# Patient Record
Sex: FEMALE | Race: WHITE | NOT HISPANIC OR LATINO
[De-identification: names, ages, dates, MRNs, and addresses within clinical notes are randomized per-mention and may not be internally consistent; named-entity substitution may affect disease eponyms.]

---

## 2021-10-12 ENCOUNTER — NON-APPOINTMENT (OUTPATIENT)
Age: 41
End: 2021-10-12

## 2021-10-15 PROBLEM — Z00.00 ENCOUNTER FOR PREVENTIVE HEALTH EXAMINATION: Status: ACTIVE | Noted: 2021-10-15

## 2021-10-25 ENCOUNTER — APPOINTMENT (OUTPATIENT)
Dept: OBGYN | Facility: CLINIC | Age: 41
End: 2021-10-25
Payer: COMMERCIAL

## 2021-10-25 VITALS
DIASTOLIC BLOOD PRESSURE: 96 MMHG | BODY MASS INDEX: 30.53 KG/M2 | WEIGHT: 190 LBS | HEIGHT: 66 IN | SYSTOLIC BLOOD PRESSURE: 136 MMHG

## 2021-10-25 DIAGNOSIS — Z78.9 OTHER SPECIFIED HEALTH STATUS: ICD-10-CM

## 2021-10-25 DIAGNOSIS — Z80.42 FAMILY HISTORY OF MALIGNANT NEOPLASM OF PROSTATE: ICD-10-CM

## 2021-10-25 DIAGNOSIS — Z11.51 ENCOUNTER FOR SCREENING FOR HUMAN PAPILLOMAVIRUS (HPV): ICD-10-CM

## 2021-10-25 DIAGNOSIS — N97.9 FEMALE INFERTILITY, UNSPECIFIED: ICD-10-CM

## 2021-10-25 DIAGNOSIS — Z82.49 FAMILY HISTORY OF ISCHEMIC HEART DISEASE AND OTHER DISEASES OF THE CIRCULATORY SYSTEM: ICD-10-CM

## 2021-10-25 PROCEDURE — 99386 PREV VISIT NEW AGE 40-64: CPT

## 2021-10-25 NOTE — DISCUSSION/SUMMARY
[FreeTextEntry1] : - Pap/HPV obtained today\par - STI testing offered; declined\par - Mammo/Sono requisition given\par - discussed menstrual calendar, ovulation tracking, and [facility]RE[facility] referral given; also discussed quality of remaining eggs and possible need for donor egg \par - return for sono; pt w/h/o abdominal myomectomy for fibroids and last sono in 2019\par

## 2021-10-25 NOTE — COUNSELING
[Nutrition/ Exercise/ Weight Management] : nutrition, exercise, weight management [Vitamins/Supplements] : vitamins/supplements [Breast Self Exam] : breast self exam [Contraception/ Emergency Contraception/ Safe Sexual Practices] : contraception, emergency contraception, safe sexual practices [Preconception Care/ Fertility options] : preconception care, fertility options [FreeTextEntry2] : Vulvar Hygiene

## 2021-10-25 NOTE — PHYSICAL EXAM
[Appropriately responsive] : appropriately responsive [Alert] : alert [No Acute Distress] : no acute distress [Soft] : soft [Non-tender] : non-tender [Non-distended] : non-distended [No HSM] : No HSM [No Lesions] : no lesions [No Mass] : no mass [Oriented x3] : oriented x3 [Examination Of The Breasts] : a normal appearance [No Masses] : no breast masses were palpable [Labia Majora] : normal [Labia Minora] : normal [Normal] : normal [Uterine Adnexae] : normal [FreeTextEntry7] : midline vertical scar from abdominal myomectomy

## 2021-10-25 NOTE — HISTORY OF PRESENT ILLNESS
[Currently Active] : currently active [Men] : men [Vaginal] : vaginal [No] : No [FreeTextEntry1] : 41yo nullip LMP 10/6/21 here for establishment of care.  No complaints.  \par Did see fertility specialist affiliated w/[facility] but did not have a good experience.  Partner also hasn’t fathered any children [Patient refuses STI testing] : Patient refuses STI testing

## 2021-10-26 LAB — HPV HIGH+LOW RISK DNA PNL CVX: NOT DETECTED

## 2021-11-03 LAB — CYTOLOGY CVX/VAG DOC THIN PREP: ABNORMAL

## 2021-12-15 ENCOUNTER — APPOINTMENT (OUTPATIENT)
Dept: MAMMOGRAPHY | Facility: CLINIC | Age: 41
End: 2021-12-15
Payer: COMMERCIAL

## 2021-12-15 ENCOUNTER — OUTPATIENT (OUTPATIENT)
Dept: OUTPATIENT SERVICES | Facility: HOSPITAL | Age: 41
LOS: 1 days | End: 2021-12-15
Payer: COMMERCIAL

## 2021-12-15 ENCOUNTER — APPOINTMENT (OUTPATIENT)
Dept: ULTRASOUND IMAGING | Facility: CLINIC | Age: 41
End: 2021-12-15
Payer: COMMERCIAL

## 2021-12-15 ENCOUNTER — RESULT REVIEW (OUTPATIENT)
Age: 41
End: 2021-12-15

## 2021-12-15 DIAGNOSIS — Z00.8 ENCOUNTER FOR OTHER GENERAL EXAMINATION: ICD-10-CM

## 2021-12-15 PROCEDURE — 76641 ULTRASOUND BREAST COMPLETE: CPT | Mod: 26,50

## 2021-12-15 PROCEDURE — 77067 SCR MAMMO BI INCL CAD: CPT

## 2021-12-15 PROCEDURE — 77067 SCR MAMMO BI INCL CAD: CPT | Mod: 26

## 2021-12-15 PROCEDURE — 77063 BREAST TOMOSYNTHESIS BI: CPT

## 2021-12-15 PROCEDURE — 77063 BREAST TOMOSYNTHESIS BI: CPT | Mod: 26

## 2021-12-15 PROCEDURE — 76641 ULTRASOUND BREAST COMPLETE: CPT

## 2022-01-10 ENCOUNTER — APPOINTMENT (OUTPATIENT)
Dept: OBGYN | Facility: CLINIC | Age: 42
End: 2022-01-10
Payer: COMMERCIAL

## 2022-01-10 VITALS
BODY MASS INDEX: 30.86 KG/M2 | DIASTOLIC BLOOD PRESSURE: 98 MMHG | HEIGHT: 66 IN | WEIGHT: 192 LBS | SYSTOLIC BLOOD PRESSURE: 152 MMHG

## 2022-01-10 PROCEDURE — 76830 TRANSVAGINAL US NON-OB: CPT

## 2022-01-10 NOTE — PROCEDURE
[Fibroid Uterus] : fibroid uterus [Transvaginal Ultrasound] : transvaginal ultrasound [FreeTextEntry3] : Fibroid uterus; possible endometrial polyp.  Please see scanned report for details

## 2022-02-23 ENCOUNTER — APPOINTMENT (OUTPATIENT)
Dept: ULTRASOUND IMAGING | Facility: CLINIC | Age: 42
End: 2022-02-23
Payer: COMMERCIAL

## 2022-02-23 PROCEDURE — 76831 ECHO EXAM UTERUS: CPT

## 2022-02-23 PROCEDURE — 58340 CATHETER FOR HYSTEROGRAPHY: CPT

## 2022-03-01 ENCOUNTER — NON-APPOINTMENT (OUTPATIENT)
Age: 42
End: 2022-03-01

## 2022-03-07 ENCOUNTER — APPOINTMENT (OUTPATIENT)
Dept: OBGYN | Facility: CLINIC | Age: 42
End: 2022-03-07
Payer: COMMERCIAL

## 2022-03-07 VITALS
HEIGHT: 66 IN | SYSTOLIC BLOOD PRESSURE: 140 MMHG | BODY MASS INDEX: 30.37 KG/M2 | WEIGHT: 189 LBS | DIASTOLIC BLOOD PRESSURE: 80 MMHG

## 2022-03-07 PROCEDURE — 99213 OFFICE O/P EST LOW 20 MIN: CPT

## 2022-03-07 NOTE — DISCUSSION/SUMMARY
[FreeTextEntry1] : - Saline sonohysterogram showed submucosal myoma as well as polyp\par - discussed R/B/A to hysteroscopic resection; pt would like to go forward w/procedure\par - will book

## 2022-03-22 ENCOUNTER — TRANSCRIPTION ENCOUNTER (OUTPATIENT)
Age: 42
End: 2022-03-22

## 2022-04-06 ENCOUNTER — NON-APPOINTMENT (OUTPATIENT)
Age: 42
End: 2022-04-06

## 2022-04-07 ENCOUNTER — NON-APPOINTMENT (OUTPATIENT)
Age: 42
End: 2022-04-07

## 2022-05-03 ENCOUNTER — OUTPATIENT (OUTPATIENT)
Dept: OUTPATIENT SERVICES | Facility: HOSPITAL | Age: 42
LOS: 1 days | End: 2022-05-03

## 2022-05-03 VITALS
WEIGHT: 195.99 LBS | SYSTOLIC BLOOD PRESSURE: 143 MMHG | RESPIRATION RATE: 16 BRPM | OXYGEN SATURATION: 98 % | HEIGHT: 66 IN | DIASTOLIC BLOOD PRESSURE: 76 MMHG | HEART RATE: 86 BPM | TEMPERATURE: 97 F

## 2022-05-03 DIAGNOSIS — D25.9 LEIOMYOMA OF UTERUS, UNSPECIFIED: ICD-10-CM

## 2022-05-03 DIAGNOSIS — D64.9 ANEMIA, UNSPECIFIED: ICD-10-CM

## 2022-05-03 DIAGNOSIS — Z98.890 OTHER SPECIFIED POSTPROCEDURAL STATES: Chronic | ICD-10-CM

## 2022-05-03 DIAGNOSIS — F25.9 SCHIZOAFFECTIVE DISORDER, UNSPECIFIED: ICD-10-CM

## 2022-05-03 LAB
ANISOCYTOSIS BLD QL: SLIGHT — SIGNIFICANT CHANGE UP
ELLIPTOCYTES BLD QL SMEAR: SIGNIFICANT CHANGE UP
GIANT PLATELETS BLD QL SMEAR: PRESENT — SIGNIFICANT CHANGE UP
HCG UR QL: NEGATIVE — SIGNIFICANT CHANGE UP
HCT VFR BLD CALC: 27.2 % — LOW (ref 34.5–45)
HGB BLD-MCNC: 6.8 G/DL — CRITICAL LOW (ref 11.5–15.5)
MACROCYTES BLD QL: SLIGHT — SIGNIFICANT CHANGE UP
MANUAL SMEAR VERIFICATION: SIGNIFICANT CHANGE UP
MCHC RBC-ENTMCNC: 15.2 PG — LOW (ref 27–34)
MCHC RBC-ENTMCNC: 25 GM/DL — LOW (ref 32–36)
MCV RBC AUTO: 61 FL — LOW (ref 80–100)
MICROCYTES BLD QL: SIGNIFICANT CHANGE UP
NRBC # BLD: 1 /100 — HIGH (ref 0–0)
OVALOCYTES BLD QL SMEAR: SIGNIFICANT CHANGE UP
PLAT MORPH BLD: ABNORMAL
PLATELET # BLD AUTO: 260 K/UL — SIGNIFICANT CHANGE UP (ref 150–400)
PLATELET COUNT - ESTIMATE: NORMAL — SIGNIFICANT CHANGE UP
POIKILOCYTOSIS BLD QL AUTO: SIGNIFICANT CHANGE UP
POLYCHROMASIA BLD QL SMEAR: SLIGHT — SIGNIFICANT CHANGE UP
RBC # BLD: 4.46 M/UL — SIGNIFICANT CHANGE UP (ref 3.8–5.2)
RBC # FLD: 22.3 % — HIGH (ref 10.3–14.5)
RBC BLD AUTO: ABNORMAL
TARGETS BLD QL SMEAR: SLIGHT — SIGNIFICANT CHANGE UP
VARIANT LYMPHS # BLD: 0.9 % — SIGNIFICANT CHANGE UP (ref 0–6)
WBC # BLD: 6.08 K/UL — SIGNIFICANT CHANGE UP (ref 3.8–10.5)
WBC # FLD AUTO: 6.08 K/UL — SIGNIFICANT CHANGE UP (ref 3.8–10.5)

## 2022-05-03 RX ORDER — SODIUM CHLORIDE 9 MG/ML
1000 INJECTION, SOLUTION INTRAVENOUS
Refills: 0 | Status: DISCONTINUED | OUTPATIENT
Start: 2022-05-19 | End: 2022-06-02

## 2022-05-03 RX ORDER — DOCUSATE SODIUM 100 MG/1
100 CAPSULE, LIQUID FILLED ORAL TWICE DAILY
Qty: 60 | Refills: 5 | Status: ACTIVE | COMMUNITY
Start: 2022-05-03 | End: 1900-01-01

## 2022-05-03 RX ORDER — SAW PALMETTO 160 MG
500 CAPSULE ORAL DAILY
Qty: 30 | Refills: 5 | Status: ACTIVE | COMMUNITY
Start: 2022-05-03 | End: 1900-01-01

## 2022-05-03 RX ORDER — CHLORHEXIDINE GLUCONATE 4 %
325 (65 FE) LIQUID (ML) TOPICAL TWICE DAILY
Qty: 60 | Refills: 5 | Status: ACTIVE | COMMUNITY
Start: 2022-05-03 | End: 1900-01-01

## 2022-05-03 NOTE — H&P PST ADULT - HISTORY OF PRESENT ILLNESS
41 year old female with hx uterine fibroids, hx Myomectomy (2015), presents to Presurgical testing with diagnosis of leiomyoma of the uterus. Now scheduled for D&C Hysteroscopy with Myosure. 41 year old female with hx uterine fibroids, hx Myomectomy (2015), presents to Presurgical testing with diagnosis of leiomyoma of the uterus.   Now scheduled for D&C Hysteroscopy with Myosure.

## 2022-05-03 NOTE — PROVIDER CONTACT NOTE (CRITICAL VALUE NOTIFICATION) - BACKGROUND
41 yr old female with h/o uterine fibroids, h/o myomectomy was seen for PST eval and is scheduled for   D & C Hysteroscopy with myosure

## 2022-05-03 NOTE — H&P PST ADULT - NEGATIVE GENERAL GENITOURINARY SYMPTOMS
no hematuria/no renal colic/no flank pain L/no flank pain R/no bladder infections/normal urinary frequency

## 2022-05-03 NOTE — H&P PST ADULT - PROBLEM SELECTOR PLAN 1
Scheduled for D&C Hysteroscopy with Myosure on 5/19/22  Pre-op instructions provided. Pt given verbal and written instructions with teach back on Pepcid. Pt verbalized understanding with return demonstration.   Covid testing scheduled prior to surgery as per patient.   Pending labs/ Scheduled for D&C Hysteroscopy with Myosure on 5/19/22  Pre-op instructions provided. Pt given verbal and written instructions with teach back on Pepcid. Pt verbalized understanding with return demonstration.   Covid testing scheduled prior to surgery as per patient.   Pending labs.

## 2022-05-03 NOTE — H&P PST ADULT - NSICDXFAMILYHX_GEN_ALL_CORE_FT
FAMILY HISTORY:  Father  Still living? No  FHx: prostate cancer, Age at diagnosis: Age Unknown    Mother  Still living? Yes, Estimated age: Age Unknown  FH: HTN (hypertension), Age at diagnosis: Age Unknown

## 2022-05-10 ENCOUNTER — NON-APPOINTMENT (OUTPATIENT)
Age: 42
End: 2022-05-10

## 2022-05-17 ENCOUNTER — NON-APPOINTMENT (OUTPATIENT)
Age: 42
End: 2022-05-17

## 2022-05-18 ENCOUNTER — NON-APPOINTMENT (OUTPATIENT)
Age: 42
End: 2022-05-18

## 2022-05-18 ENCOUNTER — TRANSCRIPTION ENCOUNTER (OUTPATIENT)
Age: 42
End: 2022-05-18

## 2022-05-18 NOTE — ASU PATIENT PROFILE, ADULT - PAIN CHRONIC, PROFILE
"Clinic Care Coordination Contact  [facility]/Voicemail       Clinical Data: Care Coordinator Outreach  Outreach attempted x 1.  Left message on patient's voicemail with call back information and requested return call.    SW and patient have been playing phone tag in the last few weeks. Left message for patient asking that she let me know a good time to call.    Addendum:  received call from patient and spent a great deal of time listening to patient explain what has been going on with her  in the last month. Patient describes her  as sill hiding alcohol, and has gone out several times to get food for the family and she finds that he has alcohol. Patient is very frustrated by this and would like help for her spouse.   BIANCA has already sent several resources for support groups. Patient agrees to BIANCA sending referral to [facility] for individual counseling as well as possibly completing rule 25 for pt(she thinks he may have already done this) or, possibly even couples counseling.   Patient's  was seeing a therapist, but patient does not like her as she says \"She doesn't tell him to stop drinking\"    Plan: Referral sent to [facility]   will plan to follow up with patient in one month.  -Link sent to patient  [email or web address]  This program offers online meetings for those struggling with addiction.        "
no

## 2022-05-18 NOTE — ASU PATIENT PROFILE, ADULT - FALL HARM RISK - UNIVERSAL INTERVENTIONS
Bed in lowest position, wheels locked, appropriate side rails in place/Call bell, personal items and telephone in reach/Instruct patient to call for assistance before getting out of bed or chair/Non-slip footwear when patient is out of bed/[place] to call system/Physically safe environment - no spills, clutter or unnecessary equipment/Purposeful Proactive Rounding/Room/bathroom lighting operational, light cord in reach

## 2022-05-18 NOTE — ASU PATIENT PROFILE, ADULT - FALL HARM RISK - PATIENT NEEDS ASSISTANCE
MATERNAL FETAL MEDICINE IS FOLLOWING THE PATIENT  FOR   1. Abdominal pain (in area of scar)        SUBJECTIVE:   The patient reports observed for labor over the weekend, since last visit    CURRENT MEDICATIONS:  Current Outpatient Medications   Medication Sig Dispense Refill   • terconazole 0.4 % vaginal cream Insert one applicatorful intravaginally for 7 consecutive days 45 g 0   • ferrous sulfate dried 160 (50 Fe) MG extended-release tablet Take 1 tablet by mouth daily. 30 tablet 3   • docusate sodium (COLACE) 100 MG capsule Take 1 capsule by mouth 2 times daily. 60 capsule 3   • DISPENSE Indications: Pelvic Pain Supply one each pregnancy support belt for lower pelvic pain in pregnancy and history of prior  1 each 0   • nicotine (NICODERM CQ) 7 MG/24HR patch Place 1 patch onto the skin every 24 hours. 28 patch 0   • nicotine (NICODERM CQ) 14 MG/24HR patch Place 1 patch onto the skin every 24 hours. 28 patch 0   • nicotine (NICODERM CQ) 21 MG/24HR patch Place 1 patch onto the skin every 24 hours. 28 patch 0   • Prenatal MV-Min-Fe Fum-FA-DHA (PRENATAL 1 PO)      • albuterol 108 (90 Base) MCG/ACT inhaler Inhale 2 puffs into the lungs every 6 hours as needed for Shortness of Breath or Wheezing. 8.5 g 0     No current facility-administered medications for this visit.        Allergies as of 2020   • (No Known Allergies)         PHYSICAL EXAMINATION:  VITAL SIGNS:    Visit Vitals  /62   Ht 5' 2\" (1.575 m)   Wt 80.7 kg   LMP  (Exact Date)   BMI 32.56 kg/m²       LABORATORY RESULTS SINCE LAST VISIT:      TODAY'S ULTRASOUND SHOWED:  Study; transvaginal cervical imaging  Indications; incisional pain at scar site  Findings; cervical length 4 cm  Appears to be a small area of dehiscence    Study; fetal follow-up  Indications; incisional pain/small dehiscence  Findings; estimated fetal weight 6 lb 6 oz    Study; biophysical profile  Indications; incisional pain/small dehiscence  Findings; biophysical  profile 8/8    ASSESSMENT AND PLAN:  A 36w1d pregnancy being followed by MFM for   PROBLEM 1.    1. Abdominal pain (in area of scar)    Today's ultrasound significant for small area of dehiscence.  Given patient is beyond 36 weeks and 0 days I am recommending delivery rather in-house observation  PLAN:  1. Will be contacting primary physician to arrange delivery today    Total time of today's office visit 10 minutes, greater than 50% spent face to face discussion on  A. Reviewing with the patient the care plan noted above     No assistance needed

## 2022-05-19 ENCOUNTER — RESULT REVIEW (OUTPATIENT)
Age: 42
End: 2022-05-19

## 2022-05-19 ENCOUNTER — OUTPATIENT (OUTPATIENT)
Dept: OUTPATIENT SERVICES | Facility: HOSPITAL | Age: 42
LOS: 1 days | Discharge: HOME | End: 2022-05-19
Payer: COMMERCIAL

## 2022-05-19 ENCOUNTER — TRANSCRIPTION ENCOUNTER (OUTPATIENT)
Age: 42
End: 2022-05-19

## 2022-05-19 ENCOUNTER — APPOINTMENT (OUTPATIENT)
Dept: OBGYN | Facility: CLINIC | Age: 42
End: 2022-05-19

## 2022-05-19 VITALS
DIASTOLIC BLOOD PRESSURE: 89 MMHG | OXYGEN SATURATION: 99 % | TEMPERATURE: 99 F | WEIGHT: 195.99 LBS | HEART RATE: 83 BPM | RESPIRATION RATE: 16 BRPM | SYSTOLIC BLOOD PRESSURE: 133 MMHG | HEIGHT: 66 IN

## 2022-05-19 VITALS
HEART RATE: 74 BPM | RESPIRATION RATE: 15 BRPM | DIASTOLIC BLOOD PRESSURE: 73 MMHG | OXYGEN SATURATION: 100 % | SYSTOLIC BLOOD PRESSURE: 124 MMHG

## 2022-05-19 DIAGNOSIS — D25.9 LEIOMYOMA OF UTERUS, UNSPECIFIED: ICD-10-CM

## 2022-05-19 DIAGNOSIS — Z98.890 OTHER SPECIFIED POSTPROCEDURAL STATES: Chronic | ICD-10-CM

## 2022-05-19 LAB — HCG UR QL: NEGATIVE — SIGNIFICANT CHANGE UP

## 2022-05-19 PROCEDURE — 58558 HYSTEROSCOPY BIOPSY: CPT

## 2022-05-19 PROCEDURE — 88305 TISSUE EXAM BY PATHOLOGIST: CPT | Mod: 26

## 2022-05-19 DEVICE — MYOSURE TISSUE REMOVAL DEVICE REACH: Type: IMPLANTABLE DEVICE | Status: FUNCTIONAL

## 2022-05-19 RX ORDER — L.ACIDOPH/B.ANIMALIS/B.LONGUM 15B CELL
1 CAPSULE ORAL
Qty: 0 | Refills: 0 | DISCHARGE

## 2022-05-19 RX ORDER — FOLIC ACID 0.8 MG
1 TABLET ORAL
Qty: 0 | Refills: 0 | DISCHARGE

## 2022-05-19 RX ORDER — ACETAMINOPHEN 500 MG
800 TABLET ORAL
Qty: 0 | Refills: 0 | DISCHARGE

## 2022-05-19 RX ORDER — SODIUM CHLORIDE 9 MG/ML
1000 INJECTION, SOLUTION INTRAVENOUS
Refills: 0 | Status: DISCONTINUED | OUTPATIENT
Start: 2022-05-19 | End: 2022-06-02

## 2022-05-19 NOTE — ASU DISCHARGE PLAN (ADULT/PEDIATRIC) - NURSING INSTRUCTIONS
You received IV Tylenol for pain management at _1:50 PM__. Please DO NOT take any Tylenol (Acetaminophen) containing products, such as Vicodin, Percocet, Excedrin, and cold medications for the next 6 hours (until _7:50__ PM). DO NOT TAKE MORE THAN 3000 MG OF TYLENOL in a 24 hour period.

## 2022-05-19 NOTE — ASU DISCHARGE PLAN (ADULT/PEDIATRIC) - CALL YOUR DOCTOR IF YOU HAVE ANY OF THE FOLLOWING:
Bleeding that does not stop/Nausea and vomiting that does not stop/Excessive diarrhea/Increased irritability or sluggishness

## 2022-05-24 LAB — SURGICAL PATHOLOGY STUDY: SIGNIFICANT CHANGE UP

## 2022-05-25 ENCOUNTER — NON-APPOINTMENT (OUTPATIENT)
Age: 42
End: 2022-05-25

## 2023-04-29 ENCOUNTER — NON-APPOINTMENT (OUTPATIENT)
Age: 43
End: 2023-04-29

## 2023-04-29 PROBLEM — D21.9 BENIGN NEOPLASM OF CONNECTIVE AND OTHER SOFT TISSUE, UNSPECIFIED: Chronic | Status: ACTIVE | Noted: 2022-05-03

## 2023-05-03 ENCOUNTER — APPOINTMENT (OUTPATIENT)
Dept: OBGYN | Facility: CLINIC | Age: 43
End: 2023-05-03

## 2023-05-07 ENCOUNTER — NON-APPOINTMENT (OUTPATIENT)
Age: 43
End: 2023-05-07

## 2023-05-08 ENCOUNTER — APPOINTMENT (OUTPATIENT)
Dept: OBGYN | Facility: CLINIC | Age: 43
End: 2023-05-08
Payer: COMMERCIAL

## 2023-05-08 VITALS
DIASTOLIC BLOOD PRESSURE: 80 MMHG | BODY MASS INDEX: 31.34 KG/M2 | SYSTOLIC BLOOD PRESSURE: 120 MMHG | WEIGHT: 195 LBS | HEIGHT: 66 IN

## 2023-05-08 DIAGNOSIS — Z01.419 ENCOUNTER FOR GYNECOLOGICAL EXAMINATION (GENERAL) (ROUTINE) W/OUT ABNORMAL FINDINGS: ICD-10-CM

## 2023-05-08 DIAGNOSIS — D25.9 LEIOMYOMA OF UTERUS, UNSPECIFIED: ICD-10-CM

## 2023-05-08 PROCEDURE — 99396 PREV VISIT EST AGE 40-64: CPT

## 2023-05-08 NOTE — HISTORY OF PRESENT ILLNESS
[No] : Patient does not have concerns regarding sex [Currently Active] : currently active [Men] : men [FreeTextEntry1] : 41yo nullip LMP 5/1/23 here for annual exam.  Reports menses has improved since D&C in May 2022.   [phone]

## 2023-05-08 NOTE — DISCUSSION/SUMMARY
[FreeTextEntry1] : - Pap/HPV obtained today\par - STI testing offered; declined\par - Pt and  exploring other parenting options such as adoption\par - Mammo/Sono requisition given\par - Pelvic sono referral rendered for fibroid surveillance\par

## 2023-05-10 DIAGNOSIS — B96.89 ACUTE VAGINITIS: ICD-10-CM

## 2023-05-10 DIAGNOSIS — N89.8 OTHER SPECIFIED NONINFLAMMATORY DISORDERS OF VAGINA: ICD-10-CM

## 2023-05-10 DIAGNOSIS — N76.0 ACUTE VAGINITIS: ICD-10-CM

## 2023-05-10 LAB
C TRACH RRNA SPEC QL NAA+PROBE: NOT DETECTED
CANDIDA VAG CYTO: NOT DETECTED
G VAGINALIS+PREV SP MTYP VAG QL MICRO: DETECTED
HPV HIGH+LOW RISK DNA PNL CVX: NOT DETECTED
N GONORRHOEA RRNA SPEC QL NAA+PROBE: NOT DETECTED
SOURCE AMPLIFICATION: NORMAL
SOURCE TP AMPLIFICATION: NORMAL
T VAGINALIS RRNA SPEC QL NAA+PROBE: NOT DETECTED
T VAGINALIS VAG QL WET PREP: NOT DETECTED

## 2023-05-10 RX ORDER — METRONIDAZOLE 500 MG/1
500 TABLET ORAL TWICE DAILY
Qty: 14 | Refills: 0 | Status: ACTIVE | COMMUNITY
Start: 2023-05-10 | End: 1900-01-01

## 2023-05-11 RX ORDER — FLUCONAZOLE 150 MG/1
150 TABLET ORAL
Qty: 2 | Refills: 0 | Status: ACTIVE | COMMUNITY
Start: 2023-05-10 | End: 1900-01-01

## 2023-05-16 LAB — CYTOLOGY CVX/VAG DOC THIN PREP: NORMAL

## 2023-10-16 ENCOUNTER — APPOINTMENT (OUTPATIENT)
Dept: ULTRASOUND IMAGING | Facility: CLINIC | Age: 43
End: 2023-10-16
Payer: COMMERCIAL

## 2023-10-16 ENCOUNTER — APPOINTMENT (OUTPATIENT)
Dept: MAMMOGRAPHY | Facility: CLINIC | Age: 43
End: 2023-10-16
Payer: COMMERCIAL

## 2023-10-16 ENCOUNTER — OUTPATIENT (OUTPATIENT)
Dept: OUTPATIENT SERVICES | Facility: HOSPITAL | Age: 43
LOS: 1 days | End: 2023-10-16
Payer: COMMERCIAL

## 2023-10-16 ENCOUNTER — RESULT REVIEW (OUTPATIENT)
Age: 43
End: 2023-10-16

## 2023-10-16 DIAGNOSIS — Z98.890 OTHER SPECIFIED POSTPROCEDURAL STATES: Chronic | ICD-10-CM

## 2023-10-16 DIAGNOSIS — Z12.31 ENCOUNTER FOR SCREENING MAMMOGRAM FOR MALIGNANT NEOPLASM OF BREAST: ICD-10-CM

## 2023-10-16 PROCEDURE — 77067 SCR MAMMO BI INCL CAD: CPT | Mod: 26

## 2023-10-16 PROCEDURE — 76641 ULTRASOUND BREAST COMPLETE: CPT | Mod: 26,50

## 2023-10-16 PROCEDURE — 77063 BREAST TOMOSYNTHESIS BI: CPT | Mod: 26

## 2023-10-16 PROCEDURE — 77063 BREAST TOMOSYNTHESIS BI: CPT

## 2023-10-16 PROCEDURE — 77067 SCR MAMMO BI INCL CAD: CPT

## 2023-10-16 PROCEDURE — 76641 ULTRASOUND BREAST COMPLETE: CPT

## 2025-03-05 NOTE — ASU PREOP CHECKLIST - ASSESSMENT, HISTORY & PHYSICAL COMPLETED AND ON MEDICAL RECORD
[facility]     Follow-up Office Visit       Date: 03/05/2025   Patient Name: Josephine Taylor  MRN: [id number]  YOB: 1991    Chief Complaint:   Chief Complaint   Patient presents with    Follow-up     2 week follow up -  Closed Colles' fracture of left radius, initial encounter            History of Present Illness:   Josephine Taylor is a 33 y.o. female presents for follow-up of left distal radius fracture.  She was placed in the cast on 2/19/2025.  She had previously been doing well until approximately 3 days ago she hit her left wrist while moving her dog.  Reports immediate increase in pain and swelling of the left wrist.  Denies numbness or tingling.  Presents today for concern for reinjury.      Subjective   Review of Systems:   Review of Systems   Constitutional:  Negative for chills, fever, unexpected weight gain and unexpected weight loss.   HENT:  Negative for congestion, postnasal drip and rhinorrhea.    Eyes:  Negative for blurred vision.   Respiratory:  Negative for shortness of breath.    Cardiovascular:  Negative for leg swelling.   Gastrointestinal:  Negative for abdominal pain, nausea and vomiting.   Genitourinary:  Negative for difficulty urinating.   Musculoskeletal:  Positive for arthralgias. Negative for gait problem, joint swelling and myalgias.   Skin:  Negative for skin lesions and wound.   Neurological:  Negative for dizziness, weakness, light-headedness and numbness.   Hematological:  Does not bruise/bleed easily.   Psychiatric/Behavioral:  Negative for depressed mood.         Pertinent review of systems per HPI    I reviewed the patient's chief complaint, history of present illness, review of systems, past medical history, surgical history, family history, social history, medications and allergy list in the EMR on 03/05/2025 and agree with the findings above.    Objective    Vital  "Signs:   Vitals:    03/05/25 1153   BP: 108/70   Weight: 52.2 kg (115 lb)   Height: 165.1 cm (65\")     BMI: Body mass index is 19.14 kg/m².     General Appearance: No acute distress. Alert and oriented.     Chest:  Non-labored breathing on room air      Ortho Exam:  No obvious deformity left wrist.  Tender to palpation of the distal radius.  Finger range of motion is intact.  Fingers warm and well-perfused distally  Sensation intact to light touch in the median, radial and ulnar nerve distributions    Imaging/Studies:   Imaging Results (Last 24 Hours)       Procedure Component Value Units Date/Time    XR Wrist 3+ View Left [[id number]] Resulted: 03/05/25 1207     Updated: 03/05/25 1207    Narrative:      Left Wrist X-Ray    Indication: Pain    Views:  AP, Lateral, and Oblique     Comparison:  2/19/25    Findings:  Redemonstration of left distal radius fracture in stable alignment with   evidence of ongoing callus formation  No bony lesion  Normal soft tissues  Normal joint spaces    Impression:   Stable left distal radius fracture with ongoing healing                  Procedures:  Procedures    Quality Measures:   ACP:   ACP discussion was deferred.    Tobacco:   Josephine Taylor  reports that she quit smoking about 20 months ago. Her smoking use included cigarettes. She started smoking about 20 years ago. She has a 9.2 pack-year smoking history. She has been exposed to tobacco smoke. She has never used smokeless tobacco.    Assessment / Plan    Assessment/Plan:      Diagnosis Plan   1. Left wrist pain  XR Wrist 3+ View Left      2. Closed Colles' fracture of left radius, initial encounter            Patient presents for follow-up of left distal radius fracture.  She had concern for recent reinjury to the left wrist however her x-rays do appear stable on exam today.  Recommend transition to a left short arm Exos med brace.  Recommend continue immobilization for an additional 4 weeks.  Recommend patient follow-up " in 4 weeks for repeat exam.  Will put in a referral for hand therapy at that time.    Follow Up:   Return in about 4 weeks (around 4/2/2025).        Hector Ramey MD  [facility] Hand and Upper Extremity Surgeon   done

## (undated) DEVICE — PRESSURE INFUSOR BAG 1000ML

## (undated) DEVICE — SPECIMEN CONTAINER 8OZ W LID

## (undated) DEVICE — ANESTHESIA CIRCUIT ADULT HMEF

## (undated) DEVICE — PROTECTOR HEEL / ELBOW FLUFFY

## (undated) DEVICE — BASIN SET DOUBLE

## (undated) DEVICE — GLV 8 PROTEXIS (CREAM) MICRO

## (undated) DEVICE — VENODYNE/SCD SLEEVE CALF MEDIUM

## (undated) DEVICE — VISITEC 4X4

## (undated) DEVICE — TUBING IRR SET FOR CYSTOSCOPY 77"

## (undated) DEVICE — SOL IRR POUR NS 0.9% 1000ML

## (undated) DEVICE — SOL IRR POUR H2O 500ML

## (undated) DEVICE — DRAPE LIGHT HANDLE COVER (GREEN)

## (undated) DEVICE — PREP BETADINE SPONGE STICKS

## (undated) DEVICE — PACK PERI GYN

## (undated) DEVICE — GOWN XXL

## (undated) DEVICE — TUBING SUCTION NONCONDUCTIVE 6MM X 12FT

## (undated) DEVICE — DRSG TELFA 3 X 8

## (undated) DEVICE — WARMING BLANKET FULL ADULT

## (undated) DEVICE — DRAPE IRRIGATION POUCH 19X23"

## (undated) DEVICE — LABELS BLANK W PEN

## (undated) DEVICE — PACK PROCEDURAL TRAY D & C